# Patient Record
Sex: MALE | Race: WHITE | NOT HISPANIC OR LATINO | URBAN - METROPOLITAN AREA
[De-identification: names, ages, dates, MRNs, and addresses within clinical notes are randomized per-mention and may not be internally consistent; named-entity substitution may affect disease eponyms.]

---

## 2024-07-27 SDOH — HEALTH STABILITY: PHYSICAL HEALTH: ON AVERAGE, HOW MANY MINUTES DO YOU ENGAGE IN EXERCISE AT THIS LEVEL?: 20 MIN

## 2024-07-27 SDOH — HEALTH STABILITY: PHYSICAL HEALTH: ON AVERAGE, HOW MANY DAYS PER WEEK DO YOU ENGAGE IN MODERATE TO STRENUOUS EXERCISE (LIKE A BRISK WALK)?: 2 DAYS

## 2024-07-27 ASSESSMENT — SOCIAL DETERMINANTS OF HEALTH (SDOH): HOW OFTEN DO YOU GET TOGETHER WITH FRIENDS OR RELATIVES?: ONCE A WEEK

## 2024-07-29 ENCOUNTER — OFFICE VISIT (OUTPATIENT)
Dept: FAMILY MEDICINE | Facility: CLINIC | Age: 34
End: 2024-07-29
Payer: COMMERCIAL

## 2024-07-29 VITALS
SYSTOLIC BLOOD PRESSURE: 118 MMHG | DIASTOLIC BLOOD PRESSURE: 75 MMHG | HEART RATE: 61 BPM | HEIGHT: 71 IN | WEIGHT: 160 LBS | TEMPERATURE: 98.3 F | OXYGEN SATURATION: 97 % | RESPIRATION RATE: 16 BRPM | BODY MASS INDEX: 22.4 KG/M2

## 2024-07-29 DIAGNOSIS — R79.89 ELEVATED SERUM CREATININE: ICD-10-CM

## 2024-07-29 DIAGNOSIS — Z00.00 ANNUAL PHYSICAL EXAM: Primary | ICD-10-CM

## 2024-07-29 DIAGNOSIS — R11.0 NAUSEA: ICD-10-CM

## 2024-07-29 DIAGNOSIS — F41.0 PANIC ATTACK: ICD-10-CM

## 2024-07-29 DIAGNOSIS — Z23 NEED FOR DIPHTHERIA-TETANUS-PERTUSSIS (TDAP) VACCINE: ICD-10-CM

## 2024-07-29 DIAGNOSIS — F41.8 MIXED ANXIETY AND DEPRESSIVE DISORDER: ICD-10-CM

## 2024-07-29 DIAGNOSIS — M54.2 NECK PAIN: ICD-10-CM

## 2024-07-29 DIAGNOSIS — Z11.59 NEED FOR HEPATITIS C SCREENING TEST: ICD-10-CM

## 2024-07-29 DIAGNOSIS — G43.009 MIGRAINE WITHOUT AURA AND WITHOUT STATUS MIGRAINOSUS, NOT INTRACTABLE: ICD-10-CM

## 2024-07-29 PROCEDURE — 80048 BASIC METABOLIC PNL TOTAL CA: CPT | Performed by: FAMILY MEDICINE

## 2024-07-29 PROCEDURE — 86803 HEPATITIS C AB TEST: CPT | Performed by: FAMILY MEDICINE

## 2024-07-29 PROCEDURE — 82043 UR ALBUMIN QUANTITATIVE: CPT | Performed by: FAMILY MEDICINE

## 2024-07-29 RX ORDER — MIRTAZAPINE 7.5 MG/1
3.75 TABLET, FILM COATED ORAL AT BEDTIME
Qty: 45 TABLET | Refills: 3 | Status: SHIPPED | OUTPATIENT
Start: 2024-07-29

## 2024-07-29 RX ORDER — GUANFACINE 1 MG/1
TABLET ORAL
COMMUNITY

## 2024-07-29 RX ORDER — BUPROPION HYDROCHLORIDE 200 MG/1
200 TABLET, EXTENDED RELEASE ORAL DAILY
Qty: 90 TABLET | Refills: 3 | Status: SHIPPED | OUTPATIENT
Start: 2024-07-29

## 2024-07-29 RX ORDER — SUMATRIPTAN 20 MG/1
SPRAY NASAL
COMMUNITY

## 2024-07-29 RX ORDER — BUPROPION HYDROCHLORIDE 200 MG/1
TABLET, EXTENDED RELEASE ORAL
COMMUNITY

## 2024-07-29 ASSESSMENT — ANXIETY QUESTIONNAIRES
2. NOT BEING ABLE TO STOP OR CONTROL WORRYING: NOT AT ALL
7. FEELING AFRAID AS IF SOMETHING AWFUL MIGHT HAPPEN: NOT AT ALL
6. BECOMING EASILY ANNOYED OR IRRITABLE: SEVERAL DAYS
GAD7 TOTAL SCORE: 2
5. BEING SO RESTLESS THAT IT IS HARD TO SIT STILL: NOT AT ALL
3. WORRYING TOO MUCH ABOUT DIFFERENT THINGS: NOT AT ALL
GAD7 TOTAL SCORE: 2
IF YOU CHECKED OFF ANY PROBLEMS ON THIS QUESTIONNAIRE, HOW DIFFICULT HAVE THESE PROBLEMS MADE IT FOR YOU TO DO YOUR WORK, TAKE CARE OF THINGS AT HOME, OR GET ALONG WITH OTHER PEOPLE: NOT DIFFICULT AT ALL
1. FEELING NERVOUS, ANXIOUS, OR ON EDGE: SEVERAL DAYS

## 2024-07-29 ASSESSMENT — PATIENT HEALTH QUESTIONNAIRE - PHQ9
SUM OF ALL RESPONSES TO PHQ QUESTIONS 1-9: 3
5. POOR APPETITE OR OVEREATING: NOT AT ALL

## 2024-07-29 NOTE — NURSING NOTE
"33 year old  Chief Complaint   Patient presents with    Physical     Establish care. Neck pain, ongoing and causes migraines. Possible PT referral if reccommended. Medication management.        Blood pressure 118/75, pulse 61, temperature 98.3  F (36.8  C), temperature source Skin, resp. rate 16, height 1.803 m (5' 11\"), weight 72.6 kg (160 lb), SpO2 97%. Body mass index is 22.32 kg/m .  There is no problem list on file for this patient.      Wt Readings from Last 2 Encounters:   07/29/24 72.6 kg (160 lb)     BP Readings from Last 3 Encounters:   07/29/24 118/75         Current Outpatient Medications   Medication Sig Dispense Refill    buPROPion (WELLBUTRIN SR) 200 MG 12 hr tablet       guanFACINE (TENEX) 1 MG tablet       mirtazapine (REMERON) 3.75 mg TABS half-tab       SUMAtriptan (IMITREX) 20 MG/ACT nasal spray        No current facility-administered medications for this visit.       Social History     Tobacco Use    Smoking status: Never    Smokeless tobacco: Never   Substance Use Topics    Alcohol use: Never    Drug use: Never       Health Maintenance Due   Topic Date Due    YEARLY PREVENTIVE VISIT  Never done    ADVANCE CARE PLANNING  Never done    HIV SCREENING  Never done    HEPATITIS C SCREENING  Never done    HEPATITIS B IMMUNIZATION (1 of 3 - 19+ 3-dose series) Never done    DTAP/TDAP/TD IMMUNIZATION (1 - Tdap) Never done       No results found for: \"PAP\"      July 29, 2024 11:11 AM   "

## 2024-07-29 NOTE — PATIENT INSTRUCTIONS
ASSESSMENT/PLAN:    Annual Exam/Preventive Issues   - Labs: Check BMP and Urine for Albumin/Creat as history of elevated Creatinine  - Immunizations: Give Tdap  - Other recommendations:   Main thing would be to increase exercise. Discussed strength training plus flexibility (eg Yoga) and also cardiovascular exercise daily. Discussed benefit of active commuting    -Specific concerns:     Depression and Anxiety   -Refilled Wellbutrin and Mirtazapine (which he also uses for nausea)  -Not in counseling now, but has been in counseling prior and knows techniques.   'Plus, he has a list of recommendations  Discussed nasal breathing for relaxation      2. Neck pain  -Referred to Physical therapy      Sj Manrique MD  Family Medicine and Sports Medicine

## 2024-07-29 NOTE — PROGRESS NOTES
Preventive Care Visit  [facility]    wJ Gonzalez presents to [facility] today to have an annual exam.      ASSESSMENT/PLAN:    Annual Exam/Preventive Issues   - Labs: Check BMP and Urine for Albumin/Creat as history of elevated Creatinine  - Immunizations: Give Tdap  - Other recommendations:   Main thing would be to increase exercise. Discussed strength training plus flexibility (eg Yoga) and also cardiovascular exercise daily. Discussed benefit of active commuting    -Specific concerns:     Depression and Anxiety   -Refilled Wellbutrin and Mirtazapine (which he also uses for nausea)  -Not in counseling now, but has been in counseling prior and knows techniques.   'Plus, he has a list of recommendations  Discussed nasal breathing for relaxation      2. Neck pain  -Referred to Physical therapy      Sj Manrique MD  Family Medicine and Sports Medicine      INTRODUCTION:   This is Jw's first visit to our clinic.  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  Recently moved to the area.  He is here for an annual preventive exam.    Also, here to establish care and obtain refills of Wellbutrin and Mirtazapine.   Takes the 12-hour Wellbutrin in the morning for Depression and anxiety  Takes Mirtazapine in evening for same plus Nausea    Has had some borderline Creatinine in the past.   He's doing well.     Only other issue is chronic neck pain. The neck pain can lead to his migraine headaches    Main medical issues have been:    Patient Active Problem List   Diagnosis    Mixed anxiety and depressive disorder    Nausea    Migraine without aura and without status migrainosus, not intractable    Panic attack       Current Medications include:   Current Outpatient Medications   Medication Sig Dispense Refill    buPROPion (WELLBUTRIN SR) 200 MG 12 hr tablet       buPROPion (WELLBUTRIN SR) 200 MG 12 hr tablet Take 1 tablet (200 mg) by mouth daily 90 tablet 3     guanFACINE (TENEX) 1 MG tablet       mirtazapine (REMERON) 3.75 mg TABS half-tab       mirtazapine (REMERON) 7.5 MG tablet Take 0.5 tablets (3.75 mg) by mouth at bedtime 45 tablet 3    SUMAtriptan (IMITREX) 20 MG/ACT nasal spray        Allergies   Allergen Reactions    Sulfa Antibiotics Nausea and Rash       No past surgical history on file.    Health Maintenance   Topic Date Due    YEARLY PREVENTIVE VISIT  Never done    ADVANCE CARE PLANNING  Never done    HEPATITIS C SCREENING  Never done    HEPATITIS B IMMUNIZATION (1 of 3 - 19+ 3-dose series) Never done    DTAP/TDAP/TD IMMUNIZATION (1 - Tdap) Never done    INFLUENZA VACCINE (1) 09/01/2024    PHQ-2 (once per calendar year)  Completed    COVID-19 Vaccine  Completed    Pneumococcal Vaccine: Pediatrics (0 to 5 Years) and At-Risk Patients (6 to 64 Years)  Aged Out    IPV IMMUNIZATION  Aged Out    HPV IMMUNIZATION  Aged Out    MENINGITIS IMMUNIZATION  Aged Out    RSV MONOCLONAL ANTIBODY  Aged Out    HIV SCREENING  Discontinued       Immunizations are as follows:      Immunization History   Administered Date(s) Administered    COVID-19 12+ (2023-24) (Pfizer) 07/12/2024    COVID-19 Monovalent 18+ (Moderna) 04/14/2021, 05/12/2021, 12/07/2021 7/27/2024   General Health   How would you rate your overall physical health? Good   Feel stress (tense, anxious, or unable to sleep) Rather much      (!) STRESS CONCERN      7/27/2024   Nutrition   Three or more servings of calcium each day? Yes   Diet: Regular (no restrictions)   How many servings of fruit and vegetables per day? (!) 2-3   How many sweetened beverages each day? 0-1            7/27/2024   Exercise   Days per week of moderate/strenous exercise 2 days   Average minutes spent exercising at this level 20 min      (!) EXERCISE CONCERN      7/27/2024   Social Factors   Frequency of gathering with friends or relatives Once a week   Worry food won't last until get money to buy more No    No   Food not last or not  have enough money for food? No    No   Do you have housing? (Housing is defined as stable permanent housing and does not include staying ouside in a car, in a tent, in an abandoned building, in an overnight shelter, or couch-surfing.) Yes    No   Are you worried about losing your housing? No    No   Lack of transportation? No    No   Unable to get utilities (heat,electricity)? No    No   Want help with housing or utility concern? No       Multiple values from one day are sorted in reverse-chronological order         7/27/2024   Dental   Dentist two times every year? Yes            7/27/2024   TB Screening   Were you born outside of the [place]? No        Today's PHQ-2 Score:       7/27/2024     2:14 PM   PHQ-2 ( 1999 Pfizer)   Q1: Little interest or pleasure in doing things 1   Q2: Feeling down, depressed or hopeless 1   PHQ-2 Score 2   Q1: Little interest or pleasure in doing things Several days   Q2: Feeling down, depressed or hopeless Several days   PHQ-2 Score 2           7/27/2024   Substance Use   Alcohol more than 3/day or more than 7/wk Not Applicable   Do you use any other substances recreationally? No        Social History     Tobacco Use    Smoking status: Never    Smokeless tobacco: Never   Substance Use Topics    Alcohol use: Never    Drug use: Never             7/27/2024   One time HIV Screening   Previous HIV test? Yes. And, negative.           7/27/2024   STI Screening   New sexual partner(s) since last STI/HIV test? No            7/27/2024   Contraception/Family Planning   Questions about contraception or family planning No        Social:  Social History     Social History Narrative    From [place]. Went to [employer], then masters at [employer] and PhD at "[employer]" in health policy.     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     Engaged to be .     Enjoys: Cooking and baking.        ROS  PHQ-2 Score:         7/27/2024     2:14 PM   PHQ-2 ( 1999 Pfizer)   Q1: Little interest or  "pleasure in doing things 1   Q2: Feeling down, depressed or hopeless 1   PHQ-2 Score 2   Q1: Little interest or pleasure in doing things Several days   Q2: Feeling down, depressed or hopeless Several days   PHQ-2 Score 2       EXAM  /75 (BP Location: Right arm, Patient Position: Sitting, Cuff Size: Adult Regular)   Pulse 61   Temp 98.3  F (36.8  C) (Skin)   Resp 16   Ht 1.803 m (5' 11\")   Wt 72.6 kg (160 lb)   SpO2 97%   BMI 22.32 kg/m      GENERAL APPEARANCE:  he appears well  HEENT:  eyes are normal.  Tympanic membranes are normal.  Neck is supple. No adenopathy, thyroid normal to palpation  RESP: Lungs clear to auscultation bilaterally.  Axillae: no palpable axillary masses or adenopathy  CV: regular rate and rhythm, normal S1 S2,  no murmur, no carotid bruits  ABDOMEN: nontender, without HSM or masses. Bowel sounds normal  : Deferred. Reported no concerns.   Rectal exam: deferred  MS:    Some pain bilaterally in the neck just below the occiput.  Full range of motion of the neck. full range of motion of all extremities.  His gait is normal.    SKIN: No suspicious lesions or rashes  NEURO: Normal strength and tone, sensory exam grossly normal  PSYCH: mentation and affect appear normal.   EXT: no peripheral edema      SEE TOP OF NOTE FOR ASSESSMENT AND PLAN      Sj Manrique MD  Family Medicine and Sports Medicine  [facility] Department of Family Medicine and Community [facility]  "

## 2024-07-30 LAB
ANION GAP SERPL CALCULATED.3IONS-SCNC: 9 MMOL/L (ref 7–15)
BUN SERPL-MCNC: 14.3 MG/DL (ref 6–20)
CALCIUM SERPL-MCNC: 9.6 MG/DL (ref 8.8–10.4)
CHLORIDE SERPL-SCNC: 104 MMOL/L (ref 98–107)
CREAT SERPL-MCNC: 1.26 MG/DL (ref 0.67–1.17)
CREAT UR-MCNC: 47.8 MG/DL
EGFRCR SERPLBLD CKD-EPI 2021: 78 ML/MIN/1.73M2
GLUCOSE SERPL-MCNC: 90 MG/DL (ref 70–99)
HCO3 SERPL-SCNC: 27 MMOL/L (ref 22–29)
HCV AB SERPL QL IA: NONREACTIVE
MICROALBUMIN UR-MCNC: <12 MG/L
MICROALBUMIN/CREAT UR: NORMAL MG/G{CREAT}
POTASSIUM SERPL-SCNC: 4.5 MMOL/L (ref 3.4–5.3)
SODIUM SERPL-SCNC: 140 MMOL/L (ref 135–145)

## 2024-08-01 ENCOUNTER — THERAPY VISIT (OUTPATIENT)
Dept: PHYSICAL THERAPY | Facility: CLINIC | Age: 34
End: 2024-08-01
Payer: COMMERCIAL

## 2024-08-01 DIAGNOSIS — G89.29 CHRONIC NECK PAIN: Primary | ICD-10-CM

## 2024-08-01 DIAGNOSIS — M54.2 CHRONIC NECK PAIN: Primary | ICD-10-CM

## 2024-08-01 DIAGNOSIS — G43.009 MIGRAINE WITHOUT AURA AND WITHOUT STATUS MIGRAINOSUS, NOT INTRACTABLE: ICD-10-CM

## 2024-08-01 DIAGNOSIS — M54.2 NECK PAIN: ICD-10-CM

## 2024-08-01 PROCEDURE — 97161 PT EVAL LOW COMPLEX 20 MIN: CPT | Mod: GP | Performed by: PHYSICAL THERAPIST

## 2024-08-01 PROCEDURE — 97112 NEUROMUSCULAR REEDUCATION: CPT | Mod: GP | Performed by: PHYSICAL THERAPIST

## 2024-08-01 PROCEDURE — 97110 THERAPEUTIC EXERCISES: CPT | Mod: GP | Performed by: PHYSICAL THERAPIST

## 2024-08-01 NOTE — PROGRESS NOTES
PHYSICAL THERAPY EVALUATION  Type of Visit: Evaluation     Fall Risk Screen:  Fall screen completed by: PT  Have you fallen 2 or more times in the past year?: No  Have you fallen and had an injury in the past year?: No  Is patient a fall risk?: No    Subjective       Presenting condition or subjective complaint: I have chronic neck pain that triggers migraine headaches.  Pain usually SO region and if gets HA then is on the same side as the pain.  Worsened end of grad school then got a little better.  A few weeks ago was noticing worsening of symptoms (potentially on worse posture days) but had progressed so having a HA every other day.   Feels slept wrong 5-6 years ago and neck hasn't been the same since.  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  Would like to get back into weight lifting.    Pain assessment: See objective evaluation for additional pain details     Objective       CERVICAL SPINE EVALUATION  PAIN:   Pain Level at worst: 6/10 neck pain then triggers migraines 8-9/10 intensity  Pain Location: suboccipital region.  Tightness B UT region, HA SO region and to eye on which ever side neck pain is on  Pain Quality: aching,  headaches throbbing  Pain Frequency: intermittent  Associated symptoms (numb, tingling, weakness etc): none other than headaches/migraines  Pain is Worst (time of day): evening, but sometimes does wake up with a lot of pain.  Pain is Exacerbated By: computer work, using phone, looking down at phone, lounging  Pain is Relieved By: self massage, TENS machine, stretching, HA needs to rest with lights off and medication for migraines.  Pain Progression: up and down    Disturbed sleep (yes/no): yes  Number of pillows: 1  Sleeping postures (prone/sup/side R/L): back     Headaches (yes/no): yes    Previous episodes (0/1-5/6-10/11+):  Year of first episode: 5-6 yrs ago     GAIT:   Weightbearing Status:   Assistive Device(s):   Gait Deviations:   Balance/Proprioception:   Weight Bearing Alignment: Cervical WB Alignment:mild forward head, slight flexion of upper thoracic    Postural Observation:   Sitting:  fair posture   Protruded head: Yes   Lateral deviation:  Nil.  Change of posture: No effect Wry Neck: Nil  Other observations/functional baselines:      Shoulder ROM: WNL feels pulling with overhead      CROM/Movement Loss Neck:   Ganga Mod Min Nil Symptoms   Protrusion    x Mild strain to SO region   Flexion    x    Retraction    x    Extension   x  Head feels heavy, ERP   Lateral flexion R   x     Lateral flexion L   x     Rotation R   x  Tilts slightly down into flexion   Rotation L   x     Thoracic flexion        Thoracic extension        Thoracic rotation R        Thoracic rotation L            MYOTOMES: Cervical All 5/5 WNL  DERMATOMES: WNL equal B  Special Tests:   PALPATION: mild tenderness B SO muscles    Mechanical Diagnostic Therapy Assessment:  Test Movements:   During: produces, abolishes, increases, decreases, no effect, centralizing, peripheralizing  After: better, worse, no better, no worse, no effect, centralized, peripheralized      Patient has done cervical retractions supine and seated with some help, has  done about 1x/day or more if has pain.    Symptomatic response Mechanical response    During testing After testing Effect - increased ROM, decreased ROM, or key functional test No Effect   Pretest symptoms sittin/10     Rep PRO       Rep RET NE NE  x   Rep RET EXT Produce base of neck pain NW (uses hands to lift back up to neutral)     Rep RET patient OP Increase stretch NW       Pretest symptoms lying:     During testing After testing Effect - increased ROM, decreased ROM, or key functional test No Effect   Rep RET NE NE no pain today     Rep RET EXT           Provisional Classification:  potential derangement      Principle of Management:  Education:  Educate lumbar support or thoracic support.  Be aware of posture and modify to prevent looking down for prolonged periods, utilize document reader or support arm to look at phone etc, use sit stand desk, educate POC to work towards calming symptoms of pain and headaches then progress to strengthening for posture etc.        Extension principle:  cervical retraction with patient OP x 10 every 2-3 hours or prn for relief, may sustain up to 3 min.    Other:  osteoporosis UE press    Assessment & Plan   CLINICAL IMPRESSIONS  Medical Diagnosis: Chronic Neck pain    Treatment Diagnosis:     Impression/Assessment: Patient is a 33 year old male with neck and headache complaints.  The following significant findings have been identified: Pain, Decreased ROM/flexibility, Decreased strength, and Decreased activity tolerance. These impairments interfere with their ability to perform self care tasks and work tasks as compared to previous level of function.     Clinical Decision Making (Complexity):  Clinical Presentation: Evolving/Changing  Clinical Presentation Rationale: based on medical and personal factors listed in PT evaluation  Clinical Decision Making (Complexity): Low complexity    PLAN OF CARE  Treatment Interventions:  Interventions: Manual Therapy, Neuromuscular  Re-education, Therapeutic Activity, Therapeutic Exercise    Long Term Goals            Frequency of Treatment: 1x/week  Duration of Treatment: 8 weeks    Education Assessment:        Risks and benefits of evaluation/treatment have been explained.   Patient/Family/caregiver agrees with Plan of Care.     Evaluation Time:     PT Eval, Low Complexity Minutes (00718): 20       Signing Clinician: Adilene Malhotra PT

## 2024-08-12 ENCOUNTER — THERAPY VISIT (OUTPATIENT)
Dept: PHYSICAL THERAPY | Facility: CLINIC | Age: 34
End: 2024-08-12
Payer: COMMERCIAL

## 2024-08-12 DIAGNOSIS — G89.29 CHRONIC NECK PAIN: ICD-10-CM

## 2024-08-12 DIAGNOSIS — M54.2 CHRONIC NECK PAIN: ICD-10-CM

## 2024-08-12 DIAGNOSIS — G43.009 MIGRAINE WITHOUT AURA AND WITHOUT STATUS MIGRAINOSUS, NOT INTRACTABLE: Primary | ICD-10-CM

## 2024-08-12 PROCEDURE — 97110 THERAPEUTIC EXERCISES: CPT | Mod: GP | Performed by: PHYSICAL THERAPIST

## 2024-08-12 PROCEDURE — 97112 NEUROMUSCULAR REEDUCATION: CPT | Mod: GP | Performed by: PHYSICAL THERAPIST

## 2024-08-12 PROCEDURE — 97140 MANUAL THERAPY 1/> REGIONS: CPT | Mod: GP | Performed by: PHYSICAL THERAPIST

## 2024-08-19 ENCOUNTER — THERAPY VISIT (OUTPATIENT)
Dept: PHYSICAL THERAPY | Facility: CLINIC | Age: 34
End: 2024-08-19
Payer: COMMERCIAL

## 2024-08-19 DIAGNOSIS — M54.2 CHRONIC NECK PAIN: ICD-10-CM

## 2024-08-19 DIAGNOSIS — G89.29 CHRONIC NECK PAIN: ICD-10-CM

## 2024-08-19 DIAGNOSIS — G43.009 MIGRAINE WITHOUT AURA AND WITHOUT STATUS MIGRAINOSUS, NOT INTRACTABLE: Primary | ICD-10-CM

## 2024-08-19 PROCEDURE — 97140 MANUAL THERAPY 1/> REGIONS: CPT | Mod: GP | Performed by: PHYSICAL THERAPIST

## 2024-08-19 PROCEDURE — 97112 NEUROMUSCULAR REEDUCATION: CPT | Mod: GP | Performed by: PHYSICAL THERAPIST

## 2024-08-19 PROCEDURE — 97110 THERAPEUTIC EXERCISES: CPT | Mod: GP | Performed by: PHYSICAL THERAPIST

## 2024-09-09 ENCOUNTER — THERAPY VISIT (OUTPATIENT)
Dept: PHYSICAL THERAPY | Facility: CLINIC | Age: 34
End: 2024-09-09
Payer: COMMERCIAL

## 2024-09-09 DIAGNOSIS — G89.29 CHRONIC NECK PAIN: ICD-10-CM

## 2024-09-09 DIAGNOSIS — G43.009 MIGRAINE WITHOUT AURA AND WITHOUT STATUS MIGRAINOSUS, NOT INTRACTABLE: Primary | ICD-10-CM

## 2024-09-09 DIAGNOSIS — M54.2 CHRONIC NECK PAIN: ICD-10-CM

## 2024-09-09 PROCEDURE — 97140 MANUAL THERAPY 1/> REGIONS: CPT | Mod: GP | Performed by: PHYSICAL THERAPIST

## 2024-09-09 PROCEDURE — 97110 THERAPEUTIC EXERCISES: CPT | Mod: GP | Performed by: PHYSICAL THERAPIST

## 2024-09-09 PROCEDURE — 97112 NEUROMUSCULAR REEDUCATION: CPT | Mod: GP | Performed by: PHYSICAL THERAPIST

## 2024-09-09 NOTE — PROGRESS NOTES
09/09/24 0500   Appointment Info   Signing clinician's name / credentials Adilene Malhotra DPT   Total/Authorized Visits 8(E&T)   Visits Used 4   Medical Diagnosis Chronic Neck pain, migraines   PT Tx Diagnosis potential cervical derangement, SO HA and migraines   Progress Note/Certification   Onset of illness/injury or Date of Surgery 07/29/24  (MD visit)   Therapy Frequency 1x/week   Predicted Duration 8 weeks   Progress Note Completed Date 08/01/24   PT Goal 1   Goal Identifier headaches   Goal Description Patient to have only 1 headache per month   Rationale to maximize safety and independence with self cares;to maximize safety and independence within the community  (concentraction)   Goal Progress One HA's in past 3 weeks after father in law passed away with poor postures/stress,previously HA every other day   Target Date 09/26/24   Subjective Report   Subjective Report 3 week lapse in therapy and father in law passed away 2.5 weeks ago so posture has been worse but only had one headache the day 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  Uses tennis ball before going to bed at night or during the day if had poor posture and this helps.  A little stiffness today on R side.  Patient feeling can progress independently with home exercise program as has learned the tools needs for independent management.   Objective Measures   Objective Measures Objective Measure 1;Objective Measure 2;Objective Measure 3   Objective Measure 1   Objective Measure CROM   Details flex min loss tension to R neck, ext min loss, B SB min loss with only tension R UT with L SB, and B rotation no loss tension.   Objective Measure 2   Objective Measure hypertonic R>L UT, levator   Objective Measure 3   Objective Measure Improved posture awareness and scapular stabilization   Details Neck Disability Index improved from 24% to 8%.   Treatment Interventions (PT)   Interventions Therapeutic Procedure/Exercise;Neuromuscular Re-education;Manual Therapy    Therapeutic Procedure/Exercise   Therapeutic Procedures: strength, endurance, ROM, flexibility minutes (21977) 11   Therapeutic Procedures Ther Proc 2;Ther Proc 3;Ther Proc 4   Ther Proc 1 Review brushing teeth analogy for prevention in the future, fitting exercise in as a routine.   Ther Proc 2 UBE 1.5 workload for warm-up   Ther Proc 2 - Details x 6 min fwd/back   Ther Proc 3 cervical retraction extension seated   Ther Proc 3 - Details x 10 produce ERP, cues can utilize fingers to assist head back to neutral.  Utilize occasionally to counteract flexion throughout the day. (keep mobility but listen to how body responds after)   PTRx Ther Proc 1 Cervical Retraction With Patient Overpressure   PTRx Ther Proc 1 - Details cervical seated retraction sustain 20-30 sec hold x 5 feels good lessens pain at home but today no pain so  NE/NE/NE    PTRx Ther Proc 2 Osteoporosis Level 1 Upper Extremity Shoulder Extension   PTRx Ther Proc 2 - Details HEP posture awareness throughout the day   PTRx Ther Proc 3 Upper Cervical Strengthening Extension   PTRx Ther Proc 3 - Details HEP for training proper technique   PTRx Ther Proc 4 Prone Trunk Extension Position A   PTRx Ther Proc 4 - Details x 10 min cue keep neck neutral throughout (ok to perform cervical retraction first, then lift   Patient Response/Progress Improving scapular and postural strength.   Therapeutic Activity   PTRx Ther Act 1 Suboccipital Release   PTRx Ther Act 1 - Details tennis ball release really helps mostly holds 10-15 min.  Review use as able but utilize to prevent pain and treat it.   Neuromuscular Re-education   Neuromuscular re-ed of mvmt, balance, coord, kinesthetic sense, posture, proprioception minutes (54006) 8   Neuro Re-ed 1 Review lumbar support or thoracic support.  Be aware of posture and modify to prevent looking down for prolonged periods, utilize document reader or support arm to look at phone etc, use sit stand desk etc.  Review lifetime  posture awareness for prevention.   PTRx Neuro Re-ed 1 Posture Correction with Lumbar Roll   PTRx Neuro Re-ed 1 - Details Review use of lumbar support (1/2 or full roll) also potential for thoracic roll horizontally or vertically.  Review utilize at work at computer in car etc.   PTRx Neuro Re-ed 2 Shoulder Theraband Rows   PTRx Neuro Re-ed 2 - Details discuss HEP Black TB at home with ab set and neutral neck x 20 (min cue neutral neck)   PTRx Neuro Re-ed 3 Shoulder Theraband Low Row/Pulldown   PTRx Neuro Re-ed 3 - Details Black TB with ab set and neutral neck  discussed  also does at gym with cable column   PTRx Neuro Re-ed 4 Prone Arm Raise #1   PTRx Neuro Re-ed 4 - Details  with lift of head in chin tucked position 10 sec x 5   Patient Response/Progress no pain with new strengthening exercises   PTRx Neuro Re-ed 5 Ball Prone Scapula W to Y   PTRx Neuro Re-ed 5 - Details x 8 W to Y on plinth discuss progress on ball or ottoman off edge of bed etc.   Manual Therapy   Manual Therapy: Mobilization, MFR, MLD, friction massage minutes (18673) 11   Manual Therapy Manual Therapy 2;Manual Therapy 3;Manual Therapy 4;Manual Therapy 5   Manual Therapy 1 SOR x 2 min   Manual Therapy 1 - Details Supine MFR UT, levator R>L   Manual Therapy 2 x 8 min   Manual Therapy 3 SOR with C2 OP x 5   Manual Therapy 3 - Details not needed today   Manual Therapy 4 Depression of shoulders/MFR UT into manual cervical traction   Manual Therapy 4 - Details 10-15 sec x 4   Manual Therapy 5 Discuss use of theracane for self MFR, patient has at home.   Patient Response/Progress hurts so good   Plan   Home program see ptrx, saniya on phone   Updates to plan of care Added prone scap W to Y   Plan for next session Discharge with independent home exercise program   Total Session Time   Timed Code Treatment Minutes 30   Total Treatment Time (sum of timed and untimed services) 30         DISCHARGE  Reason for Discharge: Patient feeling can progress  independently with home exercise program.      Discharge Plan: Patient to continue home program.    Referring Provider:  Sj Manrique

## 2024-11-30 ENCOUNTER — MYC REFILL (OUTPATIENT)
Dept: FAMILY MEDICINE | Facility: CLINIC | Age: 34
End: 2024-11-30

## 2024-11-30 DIAGNOSIS — G43.009 MIGRAINE WITHOUT AURA AND WITHOUT STATUS MIGRAINOSUS, NOT INTRACTABLE: Primary | ICD-10-CM

## 2024-12-02 RX ORDER — SUMATRIPTAN 20 MG/1
SPRAY NASAL
Qty: 8 EACH | Refills: 2 | Status: SHIPPED | OUTPATIENT
Start: 2024-12-02

## 2024-12-02 NOTE — TELEPHONE ENCOUNTER
Medication requested: SUMAtriptan (IMITREX) 20 MG/ACT nasal spray   Last office visit: 7/29/24  [facility] appointments: none  Medication last refilled: historical  Last qualifying labs:   BP Readings from Last 3 Encounters:   07/29/24 118/75     Routing refill request to provider for review/approval because:  Medication is reported/historical    Jw RAM, RN  12/02/24 3:21 PM

## 2024-12-18 ENCOUNTER — OFFICE VISIT (OUTPATIENT)
Dept: FAMILY MEDICINE | Facility: CLINIC | Age: 34
End: 2024-12-18
Payer: COMMERCIAL

## 2024-12-18 VITALS
HEART RATE: 72 BPM | BODY MASS INDEX: 22.4 KG/M2 | SYSTOLIC BLOOD PRESSURE: 112 MMHG | TEMPERATURE: 98.1 F | RESPIRATION RATE: 16 BRPM | HEIGHT: 71 IN | WEIGHT: 160 LBS | OXYGEN SATURATION: 97 % | DIASTOLIC BLOOD PRESSURE: 77 MMHG

## 2024-12-18 DIAGNOSIS — Z71.84 TRAVEL ADVICE ENCOUNTER: Primary | ICD-10-CM

## 2024-12-18 DIAGNOSIS — Z87.898 H/O NAUSEA: ICD-10-CM

## 2024-12-18 RX ORDER — AZITHROMYCIN 500 MG/1
500 TABLET, FILM COATED ORAL DAILY
Qty: 3 TABLET | Refills: 0 | Status: SHIPPED | OUTPATIENT
Start: 2024-12-18 | End: 2024-12-21

## 2024-12-18 RX ORDER — ATOVAQUONE AND PROGUANIL HYDROCHLORIDE 250; 100 MG/1; MG/1
1 TABLET, FILM COATED ORAL DAILY
Qty: 22 TABLET | Refills: 0 | Status: SHIPPED | OUTPATIENT
Start: 2024-12-18

## 2024-12-18 RX ORDER — PROMETHAZINE HYDROCHLORIDE 12.5 MG/1
12.5-25 TABLET ORAL EVERY 6 HOURS PRN
Qty: 30 TABLET | Refills: 0 | Status: SHIPPED | OUTPATIENT
Start: 2024-12-18

## 2024-12-18 NOTE — PATIENT INSTRUCTIONS
Thank you for visiting the [facility] : [phone]  Today December 18, 2024 you received the    Flu Vaccine    Typhoid - injectable. This vaccine is valid for two years.     Ixchiq (Chikungungya) Live vaccine      Follow up vaccine appointments can be made as a NURSE ONLY visit at the Travel Clinic, (BE PREPARED TO WAIT, ) or at designated [facility].    If you are receiving the Rabies vaccines series, it is important that you follow the exact schedule ordered.     Pre-travel     We recommend that you purchase Medical Evacuation Insurance prior to your departure.  Https://wwwnc.cdc.gov/travel/page/insurance    [place] your travel plans with the  Department of State through STEP ( Smart Traveler Enrollment Program ) https://step.state.gov.  STEP is a free service to allow [place] citizens and nationals traveling and living abroad to enroll their trip with the nearest U[place] Embassy or Consulate.    Animal Exposure: Avoid all mammals even if they look healthy.  If there is a bite, scratch or even a lick, wash area immediately with soap and water for 15 minutes and seek medical care within 24 hours for evaluation of Rabies post exposure treatment.  Contact your Medical Evacuation Insurance.    Repiratory illness prevention strategies ( Covid and Influenza ) CDC recommendations:  Consider wearing a mask in crowded or poorly ventilated indoor areas, including on public transportation and in transportation hubs.  Hand washing: frequent, thorough handwashing with soap and water for 20 seconds. Use an alcohol-based hand  with at least 60% alcohol if soap and water are not readily available. Avoid touching face, nose, eyes, mouth unless you have done appropriate hand washing as above.  VACCINES: Staying up to date on COVID-19 vaccines significantly lowers the risk of getting very sick, being hospitalized, or dying from COVID-19. CDC recommends that everyone stay up  to date on their COVID-19 vaccines, especially people with weakened immune systems.    Travel Covid 19 Testing:  updated 12/06/2021  International travelers: Pre-travel:  See country specific Embassy websites or airline websites.    Post travel: CDC recommends getting tested 3-5 days after your trip     Post-travel illness:  Contact your provider or [facility] if you develop a fever, rash, cough, diarrhea or other symptoms for up to 1 year after travel.  Inform your healthcare provider when and where you traveled to.    Please call the [facility] with any questions [phone]  Or send your provider a 'My Chart' note.

## 2024-12-18 NOTE — PROGRESS NOTES
"Nurse Note ( Pre-Travel Consult)    Itinerary:  [facility]    Departure Date: 1/10/25    Return Date: 1/23/25    Length of Trip 2 weeks    Reason for Travel: Tourism         Urban or rural: both    Accommodations: Hotel        IMMUNIZATION HISTORY  Have you received any immunizations within the past 4 weeks?  No  Have you ever fainted from having your blood drawn or from an injection?  No  Have you ever had a fever reaction to vaccination?  No  Have you ever had any bad reaction or side effect from any vaccination?  No  Have you ever had hepatitis A or B vaccine?  Yes  Do you live (or work closely) with anyone who has AIDS, an AIDS-like condition, any other immune disorder or who is on chemotherapy for cancer?  No  Do you have a family history of immunodeficiency?  No  Have you received any injection of immune globulin or any blood products during the past 12 months?  No    Patient roomed by Shola Swanson  Jw Gonzalez is a 33 year old male  seen today alone for counsultation for international travel.   Patient will be departing in  3 week(s) and  traveling with partner.       Patient itinerary :  will be in the [facility]  2 days and possibly [place] region of [facility]  which risk for Dengue Fever, Chikungungya, food borne illnesses, motor vehicle accidents, and Typhoid. exposure.       Patient's activities will include sightseeing and attending a wedding.     Patient's country of birth is [place]  In [place] for 6 months      Special medical concerns: none ( anxiety)  Pre-travel questionnaire was completed by patient and reviewed by provider.     Vitals: /77   Pulse 72   Temp 98.1  F (36.7  C) (Temporal)   Resp 16   Ht 1.791 m (5' 10.5\")   Wt 72.6 kg (160 lb)   SpO2 97%   BMI 22.63 kg/m    BMI= Body mass index is 22.63 kg/m .    EXAM:  General:  Well-nourished, well-developed in no acute distress.  Appears to be stated age, interacts appropriately and expresses understanding of information " given to patient.    Current Outpatient Medications   Medication Sig Dispense Refill    buPROPion (WELLBUTRIN SR) 200 MG 12 hr tablet       buPROPion (WELLBUTRIN SR) 200 MG 12 hr tablet Take 1 tablet (200 mg) by mouth daily 90 tablet 3    guanFACINE (TENEX) 1 MG tablet       mirtazapine (REMERON) 3.75 mg TABS half-tab       mirtazapine (REMERON) 7.5 MG tablet Take 0.5 tablets (3.75 mg) by mouth at bedtime 45 tablet 3    SUMAtriptan (IMITREX) 20 MG/ACT nasal spray Spray 1 spray in one nostril at onset of headache. May repeat 1 spray in 2 hours if needed. Do not exceed 2 sprays in 24 hours. 8 each 2     Patient Active Problem List   Diagnosis    Mixed anxiety and depressive disorder    Nausea    Panic attack     Allergies   Allergen Reactions    Sulfa Antibiotics Nausea and Rash         Immunizations discussed include:   Covid 19: recent infection  Hepatitis A:  Up to date  Hepatitis B: Up to date  Influenza: Ordered/given today, risks, benefits and side effects reviewed  Typhoid: Ordered/given today, risks, benefits and side effects reviewed  Rabies: Declined  reviewed managment of a animal bite or scratch (washing wound, seek medical care within 24 hours for post exposure prophylaxis )  Yellow Fever: Not indicated  Japanese Encephalitis: Declined  Not concerned about risk of disease  Meningococcus: Not indicated  Tetanus/Diphtheria: Up to date  Measles/Mumps/Rubella: Up to date  Cholera: Not needed  Polio: Up to date  Pneumococcal: Under age of 65  Varicella: Immune by disease history per patient report  Shingrix: Not indicated  HPV:  Not indicated   Chikungunya:  given today for travel to risk area ( current outbreak per [facility])    TB: low risk     Altitude Exposure on this trip: no  Past tolerance to Altitude: na    ASSESSMENT/PLAN:  Jw was seen today for travel clinic.    Diagnoses and all orders for this visit:    Travel advice encounter  -     atovaquone-proguanil (MALARONE) 250-100 MG tablet; Take 1  tablet by mouth daily. Start 2 days before exposure to Malaria and continue daily till  7 days after exposure.  -     azithromycin (ZITHROMAX) 500 MG tablet; Take 1 tablet (500 mg) by mouth daily for 3 doses. Take 1 tablet a day for up to 3 days for severe diarrhea  -     promethazine (PHENERGAN) 12.5 MG tablet; Take 1-2 tablets (12.5-25 mg) by mouth every 6 hours as needed for nausea or vomiting.    H/O nausea  -     promethazine (PHENERGAN) 12.5 MG tablet; Take 1-2 tablets (12.5-25 mg) by mouth every 6 hours as needed for nausea or vomiting.    Other orders  -     INFLUENZA VACCINE, SPLIT VIRUS, TRIVALENT,PF (FLUZONE\FLUARIX)  -     IXCHIQ (CHIKUNGUNYA VIRUS) LIVE VACCINE  -     TYPHOID VACCINE, IM      I have reviewed general recommendations for safe travel   including: food/water precautions, insect precautions, safer sex   practices given high prevalence of Zika, HIV and other STDs,   roadway safety. Educational materials and [employer] report provided.    Malaraia prophylaxis recommended: Malarone  Symptomatic treatment for traveler's diarrhea: azithromycin  For nausea ( Promethazine ) patient preferred        Evacuation insurance advised and resources were provided to patient.    Total visit time 30 minutes  with over 50% of time spent counseling patient and shared decision making as detailed above.    Lucila Robbins CNP  Certificate in Travel Health

## 2025-08-31 ENCOUNTER — HEALTH MAINTENANCE LETTER (OUTPATIENT)
Age: 35
End: 2025-08-31